# Patient Record
Sex: FEMALE
[De-identification: names, ages, dates, MRNs, and addresses within clinical notes are randomized per-mention and may not be internally consistent; named-entity substitution may affect disease eponyms.]

---

## 2019-04-30 ENCOUNTER — HOSPITAL ENCOUNTER (EMERGENCY)
Dept: HOSPITAL 31 - C.ER | Age: 74
Discharge: HOME | End: 2019-04-30
Payer: MEDICARE

## 2019-04-30 VITALS
RESPIRATION RATE: 18 BRPM | SYSTOLIC BLOOD PRESSURE: 134 MMHG | TEMPERATURE: 98 F | HEART RATE: 95 BPM | DIASTOLIC BLOOD PRESSURE: 70 MMHG

## 2019-04-30 VITALS — OXYGEN SATURATION: 98 %

## 2019-04-30 VITALS — BODY MASS INDEX: 25 KG/M2

## 2019-04-30 DIAGNOSIS — I50.9: ICD-10-CM

## 2019-04-30 DIAGNOSIS — I12.0: ICD-10-CM

## 2019-04-30 DIAGNOSIS — R55: Primary | ICD-10-CM

## 2019-04-30 DIAGNOSIS — Z99.2: ICD-10-CM

## 2019-04-30 DIAGNOSIS — N18.6: ICD-10-CM

## 2019-04-30 LAB
ALBUMIN SERPL-MCNC: 4.3 G/DL (ref 3.5–5)
ALBUMIN/GLOB SERPL: 1.1 {RATIO} (ref 1–2.1)
ALT SERPL-CCNC: 19 U/L (ref 9–52)
AST SERPL-CCNC: 45 U/L (ref 14–36)
BASOPHILS # BLD AUTO: 0.1 K/UL (ref 0–0.2)
BASOPHILS NFR BLD: 0.5 % (ref 0–2)
BUN SERPL-MCNC: 30 MG/DL (ref 7–17)
CALCIUM SERPL-MCNC: 9.6 MG/DL (ref 8.6–10.4)
EOSINOPHIL # BLD AUTO: 0.1 K/UL (ref 0–0.7)
EOSINOPHIL NFR BLD: 0.7 % (ref 0–4)
EOSINOPHIL NFR BLD: 1 % (ref 0–4)
ERYTHROCYTE [DISTWIDTH] IN BLOOD BY AUTOMATED COUNT: 16.3 % (ref 11.5–14.5)
GFR NON-AFRICAN AMERICAN: 10
HGB BLD-MCNC: 13.7 G/DL (ref 11–16)
LYMPHOCYTE: 6 % (ref 20–40)
LYMPHOCYTES # BLD AUTO: 1 K/UL (ref 1–4.3)
LYMPHOCYTES NFR BLD AUTO: 7.2 % (ref 20–40)
MCH RBC QN AUTO: 28.9 PG (ref 27–31)
MCHC RBC AUTO-ENTMCNC: 32.7 G/DL (ref 33–37)
MCV RBC AUTO: 88.2 FL (ref 81–99)
MONOCYTE: 4 % (ref 0–10)
MONOCYTES # BLD: 0.8 K/UL (ref 0–0.8)
MONOCYTES NFR BLD: 5.6 % (ref 0–10)
NEUTROPHILS # BLD: 12.1 K/UL (ref 1.8–7)
NEUTROPHILS NFR BLD AUTO: 86 % (ref 50–75)
NEUTROPHILS NFR BLD AUTO: 89 % (ref 50–75)
NRBC BLD AUTO-RTO: 0 % (ref 0–2)
PLATELET # BLD EST: NORMAL 10*3/UL
PLATELET # BLD: 273 K/UL (ref 130–400)
PMV BLD AUTO: 9.5 FL (ref 7.2–11.7)
RBC # BLD AUTO: 4.74 MIL/UL (ref 3.8–5.2)
TOTAL CELLS COUNTED BLD: 100
TROPONIN I SERPL-MCNC: 0.12 NG/ML (ref 0–0.12)
WBC # BLD AUTO: 14 K/UL (ref 4.8–10.8)

## 2019-04-30 NOTE — C.PDOC
History Of Present Illness


74 y/o female with a PMHx of HTN, DM, ESRD on HD, presents for evaluation s/p 

near syncopal episode. After finishing dialysis today patient was on her way 

home, going up her outdoor steps, when she suddenly felt dizzy and weak. Two n

eighbors on site noticed and grabbed the patient so she did not fall. There was 

no LOC. No trauma. Patient is now complaining of a mild headache. She admits she

has not eaten today. Patient denies any chest pain or SOB before, during, or 

after the near-syncopal episode. Otherwise patient denies any nausea, vomiting, 

fever, chills, dizziness, extremity weakness or numbness.





Time Seen by Provider: 19 16:02


Chief Complaint (Nursing): Syncope


History Per: Patient


History/Exam Limitations: no limitations


Onset/Duration Of Symptoms: Mins


Current Symptoms Are (Timing): Gone


Activity At Onset Of Symptoms: Walking


Seizure Or Post-ictal Symptoms: None





Past Medical History


Reviewed: Historical Data, Nursing Documentation, Vital Signs


Vital Signs: 





                                Last Vital Signs











Temp  98.7 F   19 16:01


 


Pulse  98 H  19 16:01


 


Resp  20   19 16:01


 


BP  127/65   19 16:01


 


Pulse Ox  98   19 16:01











Primary Care Provider: Hermelindo Odom





- Medical History


PMH: CHF, Diabetes, Gastritis, HTN, End Stage Renal Disease (Hemodialysis M, W, 

F), Chronic Kidney Disease, Seizures


   Denies: Alzheimer's Disease, Anemia, Anxiety, Arthritis, Asthma, Atrial 

Fibrillation, Bipolar Disorder, Bronchitis, CAD, Cardia Arrhythmia, COPD, 

Crohn's Disease, Dementia, Depression, Diverticulitis, Emphysema, Fractures, 

Gall Bladder Disease, HIV, Hypercholesterolemia, Hyperthyroidism, 

Hypothyroidism, Kidney Stones, Migraine, Mitral Valve Prolapse, Multiple 

Sclerosis, Osteoporosis, Pancreatitis, Paranoia, Parkinson's Disease, Peripheral

Edema, Pneumonia, Post Traumatic Stress Disorder, Pulmonary Embolism, Rheumatoid

Arthritis, Schizophrenia, Sickle Cell Disease, Sexually Transmitted Disease, 

Sleep Apnea, TIA


Surgical History: 


   Denies: Pacemaker





- [facility] Procedures











 (18)


CENTRAL VENOUS CATHETER PLACEMENT WITH GUIDANCE (14)


CONTRAST AORTOGRAM (13)


CORONAR ARTERIOGR-2 CATH (13)


HEMODIALYSIS (01/30/15)


INJECT ANTICOAGULANT (13)


LEFT HEART CARDIAC CATH (13)


LT HEART ANGIOCARDIOGRAM (13)


PERFORMANCE OF URINARY FILTRATION, MULTIPLE (16)








Family History: States: Unknown Family Hx





- Social History


Hx Tobacco Use: No


Hx Alcohol Use: No


Hx Substance Use: No





- Immunization History


Hx Tetanus Toxoid Vaccination: No (unknown)


Hx Influenza Vaccination: No (unknown)


Hx Pneumococcal Vaccination: No (unknown)





Review Of Systems


Constitutional: Negative for: Fever, Chills


Eyes: Negative for: Vision Change


Cardiovascular: Negative for: Chest Pain, Palpitations


Respiratory: Negative for: Shortness of Breath


Gastrointestinal: Negative for: Nausea, Vomiting


Neurological: Positive for: Headache, Other (Near syncope).  Negative for: 

Weakness, Numbness, Dizziness





Physical Exam





- Physical Exam


Appears: Non-toxic, No Acute Distress, Other (Afebrile, vitals are WNL)


Skin: Normal Color, Warm, No Diaphoretic, No Pale


Head: Atraumatic, Normacephalic


Eye(s): bilateral: Normal Inspection, PERRL, EOMI


Oral Mucosa: Moist


Neck: Normal ROM


Chest: Symmetrical


Cardiovascular: Rhythm Regular, No Murmur


Respiratory: No Accessory Muscle Use, Rales (Bibasilar crackles), No Rhonchi, No

Wheezing


Gastrointestinal/Abdominal: Bowel Sounds (normal), Soft (and obese abdomen), No 

Tenderness, No Distention


Extremity: Swelling (+1 pitting edema bilaterally)


Extremity: Bilateral: Atraumatic, Normal Color And Temperature


Pulses: Left Dorsalis Pedis: Normal, Right Dorsalis Pedis: Normal


Neurological/Psych: Oriented x3, Normal Cognition, Normal Cranial Nerves, Normal

Motor, Normal Sensation





ED Course And Treatment





- Laboratory Results


Result Diagrams: 


                                 19 16:54





                                 19 16:54


Lab Results: 





                                        











Troponin I  0.1150 ng/mL (0.00-0.120)   19  16:54    








                                        











NT-Pro-B Natriuret Pep  77227 pg/mL (0-900)  H  19  16:54    








                                        











Total Bilirubin  0.8 mg/dL (0.2-1.3)   19  16:54    


 


AST  45 U/L (14-36)  H D 19  16:54    


 


ALT  19 U/L (9-52)   19  16:54    


 


Alkaline Phosphatase  200 U/L ()  H D 19  16:54    


 


Total Protein  8.2 g/dL (6.3-8.3)   19  16:54    


 


Albumin  4.3 g/dL (3.5-5.0)   19  16:54    


 


Globulin  3.9 gm/dL (2.2-3.9)   19  16:54    


 


Albumin/Globulin Ratio  1.1  (1.0-2.1)   19  16:54    











ECG: Interpreted By Me


ECG Rhythm: Sinus Rhythm, 1st Degree HB


Interpretation Of ECG: No ST elevations or depressions


Rate From EC


O2 Sat by Pulse Oximetry: 98 (on RA)


Pulse Ox Interpretation: Normal





- Other Rad


  ** CXR


X-Ray: Read By Radiologist


Interpretation: Accession No. : [id number].  Patient Name / ID : 

RONDONROSADESEVERINO JULIA  / [id number].  Exam Date : 2019 18:11:24 ( 

Approved ).  Study Comment :  Sex / Age : F  / [id number].  Creator : cecilia woods.  Dictator : Noah Michaels MD.   :  Approver : Noah Michaels MD.  Approver2 :  Report Date : 2019 18:14:11.  My Comment

:  ************************************************

***********************************.  This report is currently processing and 

HAS NOT BEEN OFFICIALLY SIGNED BY THE PHYSICIAN - ESTIMATED TIME OF APPROVAL IS 

2019 18:33.  Date of service:  2019.  PROCEDURE:  CHEST RADIOGRAPH, 

1 VIEW.  HISTORY:  SOB.  COMPARISON:  02/10/2014.  FINDINGS:  LUNGS:  Clear.  

PLEURA:  No pneumothorax or pleural fluid seen.  CARDIOVASCULAR:  No 

radiographic findings to suggest acute or significant cardiovascular disease.  

Left upper extremity stent/shunt identified.  Atherosclerotic calcifications 

identified primarily aortic arch.  OSSEOUS STRUCTURES:  No significant 

abnormalities.  VISUALIZED UPPER ABDOMEN:  Normal.  OTHER FINDINGS:  None.  

IMPRESSION:  No active disease.No significant interval change compared to the 

prior examination(s).





Medical Decision Making


Medical Decision Making: 





Initial Impression: Hypotension vs Hypoglycemia, Near syncope in dialysis 

patient





Plan:


- Labs


- EKG


- Chest x-ray


- Tylenol PO given for headache





Reviewed old records and prior labs. 


Labs resulted: BNP elevated. WBC slightly elevated. Otherwise labs are WNL. Trop

negative.





CXR shows no acute changes.





18:30


Patient reports she feels well and wants to go home. On reexamination patient is

AAOx3 with clear speech, neurologically intact, and ambulatory with steady gait.







Disposition


Counseled Patient/Family Regarding: Studies Performed, Diagnosis, Need For 

Followup





- Disposition


Referrals: 


Hermelindo Odom MD [Staff Provider] - 


Disposition: HOME/ ROUTINE


Disposition Time: 18:29


Condition: STABLE


Instructions:  Near Fainting (DC)


Forms:  [facility] (English), General Discharge Instructions





- POA


Present On Arrival: None





- Clinical Impression


Clinical Impression: 


 Near syncope








- Scribe Statement


The provider has reviewed the documentation as recorded by the Marbinibearl Pereira





Provider Attestation: 


All medical record entries made by the Marbinibe were at my direction and 

personally dictated by me. I have reviewed the chart and agree that the record 

accurately reflects my personal performance of the history, physical exam, 

medical decision making, and the department course for this patient. I have also

personally directed, reviewed, and agree with the discharge instructions and 

disposition.

## 2019-04-30 NOTE — RAD
Date of service: 



04/30/2019



PROCEDURE:  CHEST RADIOGRAPH, 1 VIEW



HISTORY:

SOB



COMPARISON:

02/10/2014.



FINDINGS:



LUNGS:

Clear.



PLEURA:

No pneumothorax or pleural fluid seen.



CARDIOVASCULAR:

  No radiographic findings to suggest acute or significant 

cardiovascular disease.



Left upper extremity stent/shunt identified. 



Atherosclerotic calcifications identified primarily aortic arch.



OSSEOUS STRUCTURES:

No significant abnormalities.



VISUALIZED UPPER ABDOMEN:

Normal.



OTHER FINDINGS:

None. 



IMPRESSION:

No active disease.No significant interval change compared to the 

prior examination(s).

## 2019-05-01 NOTE — CARD
--------------- APPROVED REPORT --------------





Date of service: 04/30/2019



EKG Measurement

Heart [id number]

AR [id number]

[id number]

[id number]

[id number]



<Conclusion>

Sinus rhythm with 1st degree AV block

Left posterior fascicular block

Left ventricular hypertrophy with repolarization abnormality

Abnormal ECG